# Patient Record
Sex: FEMALE | URBAN - METROPOLITAN AREA
[De-identification: names, ages, dates, MRNs, and addresses within clinical notes are randomized per-mention and may not be internally consistent; named-entity substitution may affect disease eponyms.]

---

## 2023-06-06 ENCOUNTER — TELEPHONE (OUTPATIENT)
Dept: GASTROENTEROLOGY | Facility: CLINIC | Age: 58
End: 2023-06-06

## 2023-06-06 NOTE — TELEPHONE ENCOUNTER
----- Message from Lino Silver MA sent at 6/5/2023  9:36 AM CDT -----  Regarding: FW: Colonoscopy  Contact: patient    ----- Message -----  From: Chari Fuentes LPN  Sent: 6/2/2023   2:44 PM CDT  To: [facility] Gastroenterology Procedure Scheduling  Subject: FW: Colonoscopy                                  Needs to schedule a routine Colonoscopy  ----- Message -----  From: Alanis Palmer  Sent: 6/2/2023   2:37 PM CDT  To: [facility]  Subject: Colonoscopy                                      Per phone call with patient she wanted to schedule an appointment to have a colonoscopy to be done.  Please return call at [phone].    Thanks,  SJ

## 2023-06-06 NOTE — TELEPHONE ENCOUNTER
Returned caller  call and spoke to pt she is a [facility] pt and she is going to reach out to the other office.